# Patient Record
Sex: FEMALE | Race: WHITE | Employment: OTHER | URBAN - METROPOLITAN AREA
[De-identification: names, ages, dates, MRNs, and addresses within clinical notes are randomized per-mention and may not be internally consistent; named-entity substitution may affect disease eponyms.]

---

## 2017-10-17 RX ORDER — VITAMIN B COMPLEX
1 CAPSULE ORAL DAILY
COMMUNITY

## 2017-10-17 RX ORDER — BILBERRY 100 MG
375 CAPSULE ORAL
COMMUNITY

## 2017-10-17 RX ORDER — AMOXICILLIN 500 MG
CAPSULE ORAL
COMMUNITY

## 2017-10-17 RX ORDER — MAGNESIUM GLUCONATE 27 MG(500)
500 TABLET ORAL 2 TIMES DAILY
COMMUNITY

## 2017-10-17 RX ORDER — CEPHRADINE 500 MG
CAPSULE ORAL 2 TIMES DAILY
COMMUNITY

## 2017-10-17 RX ORDER — CETIRIZINE HYDROCHLORIDE 10 MG/1
10 TABLET ORAL DAILY
COMMUNITY

## 2017-10-17 RX ORDER — AMPICILLIN TRIHYDRATE 250 MG
1200 CAPSULE ORAL
COMMUNITY

## 2017-10-17 RX ORDER — UBIDECARENONE/VIT E ACET 100MG-5
CAPSULE ORAL
COMMUNITY

## 2017-10-17 RX ORDER — ASCORBIC ACID 500 MG
500 TABLET ORAL DAILY
COMMUNITY

## 2017-10-17 RX ORDER — PERPHENAZINE/AMITRIPTYLINE HCL 4 MG-25 MG
40 TABLET ORAL
COMMUNITY

## 2017-10-17 NOTE — PRE-PROCEDURE INSTRUCTIONS
1.  Do not eat or drink anything after 12 midnight prior to surgery. This includes no water, chewing gum or mints. 2.  Take the following pills with a small sip of water on the morning of surgery   3. Aspirin, Ibuprofen, Advil, Naproxen, Vitamin E and other Anti-inflammatory products should be stopped for 5 days before surgery or as directed by your physician. 4.  Check with your doctor regarding stopping Plavix, Coumadin, Lovenox, Fragmin or other blood thinners. 5.  Do not smoke and do not drink alcoholic beverages 24 hours prior to surgery. This includes NA Beer. 6.  You may brush your teeth and gargle the morning of surgery. DO NOT SWALLOW WATER.  7.  You MUST make arrangements for a responsible adult to take you home after your surgery. You will not be allowed to leave alone or drive yourself home. It is strongly suggested someone stay with you the first 24 hours. Your surgery will be cancelled if you do not have a ride home. 8.  A parent/legal guardian must accompany a child scheduled for surgery and plan to stay at the hospital until the child is discharged. Please do not bring other children with you. 9.  Please wear simple, loose fitting clothing to the hospital.  Sydni Blue not bring valuables ( money, credit cards, checkbooks, etc.)  Do not wear any makeup (including no eye makeup) or nail polish on your fingers or toes. 10.  Do not wear any jewelry or piercing on the day of surgery. All body piercing jewelry must be removed. 11.  If you have dentures, they will be removed before going to the OR; we will provide you a container. If you wear contact lenses or glasses, they will be removed; please bring a case for them. 12.  Please see your family doctor/pediatrician for a history & physical and/or concerning medications. Bring any test results/reports from your physician's office the day of surgery. 15.  Remember to bring Blood Bank Bracelet to the hospital on the day of surgery.   14.  If you have a Living Will and Durable Power of  for Healthcare, please bring in a copy. 13.  Notify your Surgeon if you develop any illness between now and surgery time; cough, cold, fever, sore throat, nausea, vomiting, etc.  Please notify your surgeon if you experience dizziness, shortness of breath or blurred vision between now and the time of your surgery. 16.  DO NOT shave your operative site 96 hours (4 days) prior to surgery. For face and neck surgery, men may use an electric razor 48 hours (2 days) prior to surgery. 17. Shower the night before surgery and the morning of surgery with  an antibacterial soap   or  Chlorhexidine gluconate (for total joint replacement). To provide excellent care, visitors will be limited to two in a room at any given time. Please no children under the age of 15 in the surgical department.

## 2017-10-22 NOTE — OP NOTE
Krystal Clement    OPERATIVE NOTE    Preoperative Diagnosis: Cataract the right eye, high astigmatism, the right eye    Postoperative Diagnosis: Cataract the right eye, high astigmatism, the right eye    Procedure: Phacoemulsification with toric intraocular lens inplantation, the right eye    Surgeon: Joe Velasco M.D., Ph.D. Anesthesia: MAC with topical    Complications: none    Specimens: none    Indications for procedure: The patient is a 77y.o. year old with decreased vision, glare and halos around lights, and trouble with activities of daily living. Examination revealed a visually significant cataract in the the right eye. The patient also had significant corneal astigmatism. Risks, benefits, and alternatives to surgery were discussed with the patient and the patient elected to proceed with phacoemulsification with lens implantation. Details of the procedure:  Prior to transfer to the operating room, the patients eye was marked with a surgical marking pen to identify the 0 and 180 degree meridians for placement of the toric intraocular lens. Following informed consent, the patient was taken to the operating room and placed in the supine position. The eye was prepped and draped in the usual sterile fashion using aseptic technique for cataract surgery. Following topical and peribulbar anesthesia, the Mastel ring was used to eleuterio the 22 degree meridien for placement of the toric intraocular lens. A side port incision was made in the superotemporal cornea. The eye was filled with Trypan blue followed by balanced salt solution. The eye was filled with visoelastic and a 2.2 mm keratome blade was used to make a 3-plane clear corneal incision in the temporal cornea. The cystitome was used to make a tear in the anterior capsule and a Utrata forceps was used to make a complete curvilinear capsulorrhexis. The lens was hydrodissected and freely rotated.   Phacoemulsification was performed without

## 2017-10-22 NOTE — H&P
The H&P was reviewed, the patient was examined, and no change has occurred in the patient's condition since the H&P was completed.     Vince Tuttle MD, PhD, FACS

## 2017-10-23 ENCOUNTER — HOSPITAL ENCOUNTER (OUTPATIENT)
Dept: SURGERY | Age: 77
Discharge: HOME | End: 2017-10-23
Attending: OPHTHALMOLOGY | Admitting: OPHTHALMOLOGY

## 2017-10-23 VITALS
HEIGHT: 64 IN | SYSTOLIC BLOOD PRESSURE: 113 MMHG | WEIGHT: 184 LBS | BODY MASS INDEX: 31.41 KG/M2 | OXYGEN SATURATION: 97 % | HEART RATE: 72 BPM | TEMPERATURE: 96.9 F | DIASTOLIC BLOOD PRESSURE: 62 MMHG | RESPIRATION RATE: 16 BRPM

## 2017-10-23 RX ORDER — SODIUM CHLORIDE 0.9 % (FLUSH) 0.9 %
10 SYRINGE (ML) INJECTION PRN
Status: DISCONTINUED | OUTPATIENT
Start: 2017-10-23 | End: 2017-10-24 | Stop reason: HOSPADM

## 2017-10-23 RX ORDER — LIDOCAINE HYDROCHLORIDE 10 MG/ML
1 INJECTION, SOLUTION EPIDURAL; INFILTRATION; INTRACAUDAL; PERINEURAL
Status: COMPLETED | OUTPATIENT
Start: 2017-10-23 | End: 2017-10-23

## 2017-10-23 RX ORDER — SODIUM CHLORIDE 0.9 % (FLUSH) 0.9 %
10 SYRINGE (ML) INJECTION EVERY 12 HOURS SCHEDULED
Status: DISCONTINUED | OUTPATIENT
Start: 2017-10-23 | End: 2017-10-24 | Stop reason: HOSPADM

## 2017-10-23 RX ORDER — MOXIFLOXACIN 5 MG/ML
1 SOLUTION/ DROPS OPHTHALMIC SEE ADMIN INSTRUCTIONS
Status: DISCONTINUED | OUTPATIENT
Start: 2017-10-23 | End: 2017-10-24 | Stop reason: HOSPADM

## 2017-10-23 RX ORDER — SODIUM CHLORIDE, SODIUM LACTATE, POTASSIUM CHLORIDE, CALCIUM CHLORIDE 600; 310; 30; 20 MG/100ML; MG/100ML; MG/100ML; MG/100ML
INJECTION, SOLUTION INTRAVENOUS CONTINUOUS
Status: DISCONTINUED | OUTPATIENT
Start: 2017-10-23 | End: 2017-10-24 | Stop reason: HOSPADM

## 2017-10-23 RX ORDER — PREDNISOLONE ACETATE 10 MG/ML
1 SUSPENSION/ DROPS OPHTHALMIC SEE ADMIN INSTRUCTIONS
Status: DISCONTINUED | OUTPATIENT
Start: 2017-10-23 | End: 2017-10-24 | Stop reason: HOSPADM

## 2017-10-23 RX ADMIN — SODIUM CHLORIDE, SODIUM LACTATE, POTASSIUM CHLORIDE, CALCIUM CHLORIDE: 600; 310; 30; 20 INJECTION, SOLUTION INTRAVENOUS at 09:04

## 2017-10-23 RX ADMIN — LIDOCAINE HYDROCHLORIDE 1 ML: 10 INJECTION, SOLUTION EPIDURAL; INFILTRATION; INTRACAUDAL; PERINEURAL at 09:04

## 2017-10-23 ASSESSMENT — PAIN SCALES - GENERAL
PAINLEVEL_OUTOF10: 0
PAINLEVEL_OUTOF10: 0

## 2017-10-23 ASSESSMENT — PAIN - FUNCTIONAL ASSESSMENT: PAIN_FUNCTIONAL_ASSESSMENT: 0-10

## 2017-10-23 NOTE — ANESTHESIA PRE-OP
Rice 600 MG CAPS Take 1,200 mg by mouth      Omega-3 Fatty Acids (FISH OIL) 1200 MG CAPS Take by mouth      b complex vitamins capsule Take 1 capsule by mouth daily      Turmeric 450 MG CAPS Take 800 mg by mouth 2 times daily      Alpha-Lipoic Acid 200 MG CAPS Take by mouth 2 times daily      vitamin C (ASCORBIC ACID) 500 MG tablet Take 500 mg by mouth daily      Bilberry 100 MG CAPS Take 375 mg by mouth      Lutein 40 MG CAPS Take 40 mg by mouth      Resveratrol 100 MG CAPS Take by mouth      magnesium gluconate (MAGONATE) 500 MG tablet Take 500 mg by mouth 2 times daily      Mometasone Furoate (NASONEX NA) by Nasal route.  Coenzyme Q10 (COQ-10) 10 MG CAPS Take  by mouth.  Evening Primrose Oil 1000 MG CAPS Take 500 mg by mouth       vitamin D (CHOLECALCIFEROL) 400 UNIT TABS tablet Take 1,000 Units by mouth daily.  Pseudoephedrine-Guaifenesin (MUCINEX D PO) Take 600 mg by mouth.          Current Facility-Administered Medications   Medication Dose Route Frequency Provider Last Rate Last Dose    bupivacaine 0.75%, phenylephrine 10%, tropicamide 1%, cyclopentolate 1%, moxifloxacin 0.5%, ketorolac 0.5% in lidocaine 2% jelly ophthalmic solution  0.3 mL Right Eye Q10 Min PRN Zari Levi MD   0.3 mL at 10/23/17 0900    moxifloxacin (VIGAMOX) 0.5 % ophthalmic solution 1 drop  1 drop Right Eye See Admin Instructions Zari Levi MD        prednisoLONE acetate (PRED FORTE) 1 % ophthalmic suspension 1 drop  1 drop Right Eye See Admin Instructions Zari Levi MD        lactated ringers infusion   Intravenous Continuous Merlin Majestic, MD        sodium chloride flush 0.9 % injection 10 mL  10 mL Intravenous 2 times per day Merlin Majestic, MD        sodium chloride flush 0.9 % injection 10 mL  10 mL Intravenous PRN Merlin Majestic, MD        lidocaine PF 1 % injection 1 mL  1 mL Intradermal Once PRN Merlin Majestic, MD        epinephrine and lidocaine 2% PF in BSS injection (1 mL) Intraocular Once Shannan Sood MD           Allergies:  No Known Allergies    Problem List:    Patient Active Problem List   Diagnosis Code    Hyperlipidemia with target LDL less than 130 E78.5    Environmental allergies Z91.09    MMT (medial meniscus tear) S83.249A    Chondromalacia of patella M22.40    Greater trochanteric bursitis M70.60    Hip pain, left M25.552    Knee pain, right M25.561       Past Medical History:        Diagnosis Date    Arthritis     Environmental allergies     Hyperlipidemia LDL goal < 130        Past Surgical History:        Procedure Laterality Date    COLONOSCOPY  12/14/11    normal       Social History:    Social History   Substance Use Topics    Smoking status: Former Smoker     Packs/day: 0.25     Years: 10.00     Quit date: 1/1/1960    Smokeless tobacco: Former User    Alcohol use No                                Counseling given: Not Answered      Vital Signs (Current):   Vitals:    10/17/17 1401 10/23/17 0850   BP:  (!) 142/75   Pulse:  70   Resp:  16   Temp:  98.2 °F (36.8 °C)   TempSrc:  Temporal   SpO2:  98%   Weight: 184 lb (83.5 kg)    Height: 5' (1.524 m) 5' 4\" (1.626 m)                                              BP Readings from Last 3 Encounters:   10/23/17 (!) 142/75   01/04/16 140/82   12/14/11 129/56       NPO Status:                                                                                 BMI:   Wt Readings from Last 3 Encounters:   10/17/17 184 lb (83.5 kg)   01/04/16 188 lb 0.8 oz (85.3 kg)   12/14/11 188 lb (85.3 kg)     Body mass index is 35.94 kg/m².     Anesthesia Evaluation  Patient summary reviewed and Nursing notes reviewed no history of anesthetic complications:   Airway: Mallampati: II     Neck ROM: full   Dental:          Pulmonary:                              Cardiovascular:                      Neuro/Psych:               GI/Hepatic/Renal:             Endo/Other:                     Abdominal:           Vascular: Anesthesia Plan      MAC     ASA 2     (Medications & allergies reviewed  All available lab & EKG data reviewed)  Induction: intravenous. Anesthetic plan and risks discussed with patient. Plan discussed with CRNA.                   Amberly Daugherty MD   10/23/2017

## 2017-10-23 NOTE — PROGRESS NOTES
Report from RN [facility] and CRNA. Pt arrived to postop from OR. See doc flow for vitals and assessment. Will monitor.

## 2017-10-31 NOTE — PRE-PROCEDURE INSTRUCTIONS
.1.  Do not eat or drink anything after 12 midnight prior to surgery. This includes no water, chewing gum or mints. 2.  Take the following pills with a small sip of water on the morning of surgery 11/07/17. 3.  Aspirin, Ibuprofen, Advil, Naproxen, Vitamin E and other Anti-inflammatory products should be stopped for 5 days before surgery or as directed by your physician. 4.  Check with your doctor regarding stopping Plavix, Coumadin, Lovenox, Fragmin or other blood thinners. 5.  Do not smoke and do not drink alcoholic beverages 24 hours prior to surgery. This includes NA Beer. 6.  You may brush your teeth and gargle the morning of surgery. DO NOT SWALLOW WATER.  7.  You MUST make arrangements for a responsible adult to take you home after your surgery. You will not be allowed to leave alone or drive yourself home. It is strongly suggested someone stay with you the first 24 hours. Your surgery will be cancelled if you do not have a ride home. 8.  A parent/legal guardian must accompany a child scheduled for surgery and plan to stay at the hospital until the child is discharged. Please do not bring other children with you. 9.  Please wear simple, loose fitting clothing to the hospital.  Elizabeth Gonzales not bring valuables ( money, credit cards, checkbooks, etc.)  Do not wear any makeup (including no eye makeup) or nail polish on your fingers or toes. 10.  Do not wear any jewelry or piercing on the day of surgery. All body piercing jewelry must be removed. 11.  If you have dentures, they will be removed before going to the OR; we will provide you a container. If you wear contact lenses or glasses, they will be removed; please bring a case for them. 12.  Please see your family doctor/pediatrician for a history & physical and/or concerning medications. Bring any test results/reports from your physician's office the day of surgery.     13.  Remember to bring Blood Bank Bracelet to the hospital on the day of

## 2017-11-06 NOTE — ANESTHESIA PRE-OP
mouth daily      Red Yeast Rice 600 MG CAPS Take 1,200 mg by mouth      Omega-3 Fatty Acids (FISH OIL) 1200 MG CAPS Take by mouth      b complex vitamins capsule Take 1 capsule by mouth daily      Turmeric 450 MG CAPS Take 800 mg by mouth 2 times daily      Alpha-Lipoic Acid 200 MG CAPS Take by mouth 2 times daily      vitamin C (ASCORBIC ACID) 500 MG tablet Take 500 mg by mouth daily      Bilberry 100 MG CAPS Take 375 mg by mouth      Lutein 40 MG CAPS Take 40 mg by mouth      Resveratrol 100 MG CAPS Take by mouth      magnesium gluconate (MAGONATE) 500 MG tablet Take 500 mg by mouth 2 times daily      Mometasone Furoate (NASONEX NA) by Nasal route.  Pseudoephedrine-Guaifenesin (MUCINEX D PO) Take 600 mg by mouth.  Coenzyme Q10 (COQ-10) 10 MG CAPS Take  by mouth.  Evening Primrose Oil 1000 MG CAPS Take 500 mg by mouth       vitamin D (CHOLECALCIFEROL) 400 UNIT TABS tablet Take 1,000 Units by mouth daily.          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    Greater trochanteric bursitis M70.60    Hip pain, left M25.552    Knee pain, right M25.561       Past Medical History:        Diagnosis Date    Arthritis     Environmental allergies     Hyperlipidemia LDL goal < 130        Past Surgical History:        Procedure Laterality Date    CATARACT REMOVAL WITH IMPLANT Right 10/23/2017    COLONOSCOPY  12/14/11    normal       Social History:    Social History   Substance Use Topics    Smoking status: Former Smoker     Packs/day: 0.25 Years: 10.00     Quit date: 1/1/1960    Smokeless tobacco: Former User    Alcohol use No                                Counseling given: Not Answered      Vital Signs (Current):   Vitals:    10/31/17 1015   Weight: 184 lb (83.5 kg)   Height: 5' 4\" (1.626 m)                                              BP Readings from Last 3 Encounters:   10/23/17 113/62   01/04/16 140/82   12/14/11 129/56       NPO Status:                                                                                 BMI:   Wt Readings from Last 3 Encounters:   10/31/17 184 lb (83.5 kg)   10/17/17 184 lb (83.5 kg)   01/04/16 188 lb 0.8 oz (85.3 kg)     Body mass index is 31.58 kg/m². Anesthesia Evaluation  Patient summary reviewed and Nursing notes reviewed no history of anesthetic complications:   Airway: Mallampati: II  TM distance: >3 FB   Neck ROM: full  Mouth opening: > = 3 FB Dental:          Pulmonary:                              Cardiovascular:    (+) hyperlipidemia                  Neuro/Psych:               GI/Hepatic/Renal:             Endo/Other:                     Abdominal:           Vascular:                                      Anesthesia Plan      MAC     ASA 3    (30-year-old female presents for phacoemulsification with intraocular lens implant of the left eye. Plan MAC with ASA standard monitors. Questions answered. Patient agreeable with anesthetic plan.  )  Induction: intravenous. Anesthetic plan and risks discussed with patient. Plan discussed with CRNA.     Attending anesthesiologist reviewed and agrees with Pre Eval content        Layla Madrid MD   11/6/2017

## 2017-11-07 ENCOUNTER — HOSPITAL ENCOUNTER (OUTPATIENT)
Dept: SURGERY | Age: 77
Discharge: HOME | End: 2017-11-07
Attending: OPHTHALMOLOGY | Admitting: OPHTHALMOLOGY

## 2017-11-07 VITALS
WEIGHT: 184 LBS | BODY MASS INDEX: 31.41 KG/M2 | TEMPERATURE: 97 F | DIASTOLIC BLOOD PRESSURE: 73 MMHG | HEART RATE: 69 BPM | SYSTOLIC BLOOD PRESSURE: 151 MMHG | OXYGEN SATURATION: 97 % | RESPIRATION RATE: 16 BRPM | HEIGHT: 64 IN

## 2017-11-07 RX ORDER — HYDRALAZINE HYDROCHLORIDE 20 MG/ML
5 INJECTION INTRAMUSCULAR; INTRAVENOUS EVERY 10 MIN PRN
Status: DISCONTINUED | OUTPATIENT
Start: 2017-11-07 | End: 2017-11-08 | Stop reason: HOSPADM

## 2017-11-07 RX ORDER — OXYCODONE HYDROCHLORIDE 5 MG/1
10 TABLET ORAL PRN
Status: ACTIVE | OUTPATIENT
Start: 2017-11-07 | End: 2017-11-07

## 2017-11-07 RX ORDER — MOXIFLOXACIN 5 MG/ML
1 SOLUTION/ DROPS OPHTHALMIC SEE ADMIN INSTRUCTIONS
Status: DISCONTINUED | OUTPATIENT
Start: 2017-11-07 | End: 2017-11-08 | Stop reason: HOSPADM

## 2017-11-07 RX ORDER — SODIUM CHLORIDE, SODIUM LACTATE, POTASSIUM CHLORIDE, CALCIUM CHLORIDE 600; 310; 30; 20 MG/100ML; MG/100ML; MG/100ML; MG/100ML
INJECTION, SOLUTION INTRAVENOUS CONTINUOUS
Status: DISCONTINUED | OUTPATIENT
Start: 2017-11-07 | End: 2017-11-08 | Stop reason: HOSPADM

## 2017-11-07 RX ORDER — PROMETHAZINE HYDROCHLORIDE 25 MG/ML
6.25 INJECTION, SOLUTION INTRAMUSCULAR; INTRAVENOUS
Status: ACTIVE | OUTPATIENT
Start: 2017-11-07 | End: 2017-11-07

## 2017-11-07 RX ORDER — MORPHINE SULFATE 10 MG/ML
1 INJECTION, SOLUTION INTRAMUSCULAR; INTRAVENOUS EVERY 5 MIN PRN
Status: DISCONTINUED | OUTPATIENT
Start: 2017-11-07 | End: 2017-11-08 | Stop reason: HOSPADM

## 2017-11-07 RX ORDER — OXYCODONE HYDROCHLORIDE 5 MG/1
5 TABLET ORAL PRN
Status: ACTIVE | OUTPATIENT
Start: 2017-11-07 | End: 2017-11-07

## 2017-11-07 RX ORDER — MEPERIDINE HYDROCHLORIDE 25 MG/ML
12.5 INJECTION INTRAMUSCULAR; INTRAVENOUS; SUBCUTANEOUS EVERY 5 MIN PRN
Status: DISCONTINUED | OUTPATIENT
Start: 2017-11-07 | End: 2017-11-08 | Stop reason: HOSPADM

## 2017-11-07 RX ORDER — PREDNISOLONE ACETATE 10 MG/ML
1 SUSPENSION/ DROPS OPHTHALMIC SEE ADMIN INSTRUCTIONS
Status: DISCONTINUED | OUTPATIENT
Start: 2017-11-07 | End: 2017-11-08 | Stop reason: HOSPADM

## 2017-11-07 RX ORDER — DIPHENHYDRAMINE HYDROCHLORIDE 50 MG/ML
12.5 INJECTION INTRAMUSCULAR; INTRAVENOUS
Status: ACTIVE | OUTPATIENT
Start: 2017-11-07 | End: 2017-11-07

## 2017-11-07 RX ORDER — METOCLOPRAMIDE HYDROCHLORIDE 5 MG/ML
10 INJECTION INTRAMUSCULAR; INTRAVENOUS
Status: ACTIVE | OUTPATIENT
Start: 2017-11-07 | End: 2017-11-07

## 2017-11-07 RX ORDER — LIDOCAINE HYDROCHLORIDE 10 MG/ML
1 INJECTION, SOLUTION EPIDURAL; INFILTRATION; INTRACAUDAL; PERINEURAL
Status: DISPENSED | OUTPATIENT
Start: 2017-11-07 | End: 2017-11-07

## 2017-11-07 RX ORDER — LABETALOL HYDROCHLORIDE 5 MG/ML
5 INJECTION, SOLUTION INTRAVENOUS EVERY 10 MIN PRN
Status: DISCONTINUED | OUTPATIENT
Start: 2017-11-07 | End: 2017-11-08 | Stop reason: HOSPADM

## 2017-11-07 ASSESSMENT — PAIN SCALES - GENERAL
PAINLEVEL_OUTOF10: 0
PAINLEVEL_OUTOF10: 0

## 2017-11-07 ASSESSMENT — PAIN - FUNCTIONAL ASSESSMENT: PAIN_FUNCTIONAL_ASSESSMENT: 0-10

## 2017-11-07 NOTE — ANESTHESIA POST-OP
Postoperative Anesthesia Note    Name:    Jesse Sotelo  MRN:      [id number]    Patient Vitals for the past 12 hrs:   BP Temp Temp src Pulse Resp SpO2   11/07/17 0811 (!) 151/73 - - 69 16 97 %   11/07/17 0800 (!) 142/70 97 °F (36.1 °C) Temporal 67 16 97 %   11/07/17 0606 (!) 145/65 97.8 °F (36.6 °C) - 71 16 98 %        LABS:    CBC  Lab Results   Component Value Date/Time    WBC 7.3 09/22/2014 11:09 AM    HGB 14.6 09/22/2014 11:09 AM    HCT 43.7 09/22/2014 11:09 AM     09/22/2014 11:09 AM     RENAL  Lab Results   Component Value Date/Time     09/22/2014 11:09 AM    K 4.7 09/22/2014 11:09 AM     09/22/2014 11:09 AM    CO2 28 09/22/2014 11:09 AM    BUN 11 09/22/2014 11:09 AM    CREATININE 0.6 09/22/2014 11:09 AM    GLUCOSE 105 (H) 09/22/2014 11:09 AM     COAGS  No results found for: PROTIME, INR, APTT    Intake & Output: In: 390 [P.O.:240; I.V.:150]  Out: -     Nausea & Vomiting:  No    Level of Consciousness:  Awake    Pain Assessment:  Adequate analgesia    Anesthesia Complications:  No apparent anesthetic complications    SUMMARY      Vital signs stable  OK to discharge from Stage I post anesthesia care.   Care transferred from Anesthesiology department on discharge from perioperative area

## 2017-11-07 NOTE — PLAN OF CARE
.Phase II:  1. Patient is identified using name and the date of birth. 2.  The patient is free from signs and symptoms of chemical, electrical, laser, radiation, positioning, or transfer/transport injury. 3.  The patient receives appropriate medication(s), safely administered during the Perioperative period. 4.  The patient has wound/tissue perfusion consistent with or improved from baseline levels established preoperatively. 5.  The patient is at or returning to normothermia at the conclusion of the immediate postoperative period. 6.  The patient's fluid, electrolyte, and acid base balances are consistent with or improved from baseline levels established preoperatively. 7.  The patient's pulmonary function is consistent with or improved from baseline levels established preoperatively. 8.  The patient's cardiovascular status is consistent with or improved from baseline levels established preoperatively. 9.  The patient/caregiver demonstrates knowledge of nutritional management related to the operative or other invasive procedure. 10. The patient/caregiver demonstrates knowledge of medication, pain, and wound management. 11. The patient participates in the rehabilitation process as applicable. 12.  The patient/caregiver participates in decisions affection his or her Perioperative plan of care. 13.  The patient's care is consistent with the individualized Perioperative plan of care. 14.  The patient's right to privacy is maintained. 15. The patient is the recipient of competent and ethical care within legal standards of practice. 16.  The patient's value system, lifestyle, ethnicity, and culture are considered, respected, and incorporated in the Perioperative plan of care and understands special services available. 17.  The patient demonstrates and/or reports adequate pain control throughout the the Perioperative period. 18.   The patient's neurological status is consistent with or improved from baseline levels established preoperatively. 23.  The patient/caregiver demonstrates knowledge of the expected responses to the operative or invasive procedure. 20.  Patient/Caregiver has reduced anxiety. Interventions- Familiarize with environment and equipment.   21. Other:  22. Other:

## 2018-11-08 ENCOUNTER — HOSPITAL ENCOUNTER (OUTPATIENT)
Dept: WOMENS IMAGING | Age: 78
Discharge: HOME | End: 2018-11-08
Payer: MEDICARE

## 2018-11-08 DIAGNOSIS — Z12.31 ENCOUNTER FOR SCREENING MAMMOGRAM FOR BREAST CANCER: ICD-10-CM

## 2018-11-08 PROCEDURE — 77067 SCR MAMMO BI INCL CAD: CPT
